# Patient Record
Sex: FEMALE | Race: WHITE | NOT HISPANIC OR LATINO | Employment: FULL TIME | URBAN - METROPOLITAN AREA
[De-identification: names, ages, dates, MRNs, and addresses within clinical notes are randomized per-mention and may not be internally consistent; named-entity substitution may affect disease eponyms.]

---

## 2017-06-13 ENCOUNTER — HISTORICAL (OUTPATIENT)
Dept: ADMINISTRATIVE | Facility: HOSPITAL | Age: 42
End: 2017-06-13

## 2017-06-13 LAB
APPEARANCE, UA: CLEAR
BACTERIA #/AREA URNS AUTO: NORMAL /HPF
BILIRUB UR QL STRIP: NEGATIVE
COLOR UR: YELLOW
GLUCOSE (UA): NEGATIVE
HGB UR QL STRIP: NEGATIVE
KETONES UR QL STRIP: NEGATIVE
LEUKOCYTE ESTERASE UR QL STRIP: NEGATIVE
NITRITE UR QL STRIP.AUTO: NEGATIVE
PH UR STRIP: 6.5 [PH] (ref 5–9)
PROT UR QL STRIP: NEGATIVE
RBC #/AREA URNS HPF: NORMAL /[HPF]
SP GR UR STRIP: 1 (ref 1–1.03)
SQUAMOUS EPITHELIAL, UA: NORMAL
UROBILINOGEN UR STRIP-ACNC: 0.2
WBC #/AREA URNS AUTO: NORMAL /[HPF]

## 2017-08-03 ENCOUNTER — HISTORICAL (OUTPATIENT)
Dept: ADMINISTRATIVE | Facility: HOSPITAL | Age: 42
End: 2017-08-03

## 2018-05-15 ENCOUNTER — HISTORICAL (OUTPATIENT)
Dept: ADMINISTRATIVE | Facility: HOSPITAL | Age: 43
End: 2018-05-15

## 2018-05-15 LAB
ABS NEUT (OLG): 4.6
ALBUMIN SERPL-MCNC: 4.4 GM/DL (ref 3.4–5)
ALBUMIN/GLOB SERPL: 1.83 {RATIO} (ref 1.5–2.5)
ALP SERPL-CCNC: 42 UNIT/L (ref 38–126)
ALT SERPL-CCNC: 12 UNIT/L (ref 7–52)
APPEARANCE, UA: CLEAR
AST SERPL-CCNC: 14 UNIT/L (ref 15–37)
BACTERIA #/AREA URNS AUTO: ABNORMAL /HPF
BILIRUB SERPL-MCNC: 0.7 MG/DL (ref 0.2–1)
BILIRUB UR QL STRIP: NEGATIVE MG/DL
BILIRUBIN DIRECT+TOT PNL SERPL-MCNC: 0.2 MG/DL (ref 0–0.5)
BILIRUBIN DIRECT+TOT PNL SERPL-MCNC: 0.5 MG/DL
BUN SERPL-MCNC: 16 MG/DL (ref 7–18)
CALCIUM SERPL-MCNC: 9 MG/DL (ref 8.5–10)
CHLORIDE SERPL-SCNC: 109 MMOL/L (ref 98–107)
CHOLEST SERPL-MCNC: 118 MG/DL (ref 0–200)
CHOLEST/HDLC SERPL: 2.6 {RATIO}
CO2 SERPL-SCNC: 28 MMOL/L (ref 21–32)
COLOR UR: ABNORMAL
CREAT SERPL-MCNC: 0.62 MG/DL (ref 0.6–1.3)
ERYTHROCYTE [DISTWIDTH] IN BLOOD BY AUTOMATED COUNT: 12.4 % (ref 11.5–17)
GLOBULIN SER-MCNC: 2.4 GM/DL (ref 1.2–3)
GLUCOSE (UA): NEGATIVE MG/DL
GLUCOSE SERPL-MCNC: 102 MG/DL (ref 74–106)
HCT VFR BLD AUTO: 40.3 % (ref 37–47)
HDLC SERPL-MCNC: 46 MG/DL (ref 35–60)
HGB BLD-MCNC: 13.8 GM/DL (ref 12–16)
HGB UR QL STRIP: ABNORMAL UNIT/L
KETONES UR QL STRIP: NEGATIVE MG/DL
LDLC SERPL CALC-MCNC: 51 MG/DL (ref 0–129)
LEUKOCYTE ESTERASE UR QL STRIP: NEGATIVE UNIT/L
LYMPHOCYTES # BLD AUTO: 2 X10(3)/MCL (ref 0.6–3.4)
LYMPHOCYTES NFR BLD AUTO: 27.4 % (ref 13–40)
MCH RBC QN AUTO: 31.9 PG (ref 27–31.2)
MCHC RBC AUTO-ENTMCNC: 34 GM/DL (ref 32–36)
MCV RBC AUTO: 93 FL (ref 80–94)
MONOCYTES # BLD AUTO: 0.7 X10(3)/MCL (ref 0–1.8)
MONOCYTES NFR BLD AUTO: 9.3 % (ref 0.1–24)
NEUTROPHILS NFR BLD AUTO: 63.3 % (ref 47–80)
NITRITE UR QL STRIP.AUTO: NEGATIVE
PH UR STRIP: 5 [PH]
PLATELET # BLD AUTO: 247 X10(3)/MCL (ref 130–400)
PMV BLD AUTO: 7.7 FL
POTASSIUM SERPL-SCNC: 5.4 MMOL/L (ref 3.5–5.1)
PROT SERPL-MCNC: 6.8 GM/DL (ref 6.4–8.2)
PROT UR QL STRIP: NEGATIVE MG/DL
RBC # BLD AUTO: 4.32 X10(6)/MCL (ref 4.2–5.4)
RBC #/AREA URNS HPF: ABNORMAL /HPF
SODIUM SERPL-SCNC: 139 MMOL/L (ref 136–145)
SP GR UR STRIP: <1.005
SQUAMOUS EPITHELIAL, UA: ABNORMAL /LPF
TRIGL SERPL-MCNC: 69 MG/DL (ref 30–150)
TSH SERPL-ACNC: 1.5 MIU/ML (ref 0.35–4.94)
UROBILINOGEN UR STRIP-ACNC: 0.2 MG/DL
VLDLC SERPL CALC-MCNC: 13.8 MG/DL
WBC # SPEC AUTO: 7.3 X10(3)/MCL (ref 4.5–11.5)
WBC #/AREA URNS AUTO: ABNORMAL /[HPF]

## 2019-05-16 ENCOUNTER — HISTORICAL (OUTPATIENT)
Dept: ADMINISTRATIVE | Facility: HOSPITAL | Age: 44
End: 2019-05-16

## 2019-05-16 LAB
ABS NEUT (OLG): 4.6 X10(3)/MCL (ref 2.1–9.2)
ALBUMIN SERPL-MCNC: 4.1 GM/DL (ref 3.4–5)
ALBUMIN/GLOB SERPL: 1.71 {RATIO} (ref 1.5–2.5)
ALP SERPL-CCNC: 42 UNIT/L (ref 38–126)
ALT SERPL-CCNC: 11 UNIT/L (ref 7–52)
APPEARANCE, UA: NORMAL
AST SERPL-CCNC: 13 UNIT/L (ref 15–37)
BACTERIA #/AREA URNS AUTO: NORMAL /HPF
BILIRUB SERPL-MCNC: 0.5 MG/DL (ref 0.2–1)
BILIRUB UR QL STRIP: NEGATIVE MG/DL
BILIRUBIN DIRECT+TOT PNL SERPL-MCNC: 0.2 MG/DL (ref 0–0.5)
BILIRUBIN DIRECT+TOT PNL SERPL-MCNC: 0.3 MG/DL
BUN SERPL-MCNC: 14 MG/DL (ref 7–18)
CALCIUM SERPL-MCNC: 9 MG/DL (ref 8.5–10)
CHLORIDE SERPL-SCNC: 106 MMOL/L (ref 98–107)
CHOLEST SERPL-MCNC: 119 MG/DL (ref 0–200)
CHOLEST/HDLC SERPL: 2.3 {RATIO}
CO2 SERPL-SCNC: 28 MMOL/L (ref 21–32)
COLOR UR: YELLOW
CREAT SERPL-MCNC: 0.6 MG/DL (ref 0.6–1.3)
ERYTHROCYTE [DISTWIDTH] IN BLOOD BY AUTOMATED COUNT: 12.1 % (ref 11.5–17)
GLOBULIN SER-MCNC: 2.4 GM/DL (ref 1.2–3)
GLUCOSE (UA): NEGATIVE MG/DL
GLUCOSE SERPL-MCNC: 100 MG/DL (ref 74–106)
HCT VFR BLD AUTO: 38.7 % (ref 37–47)
HDLC SERPL-MCNC: 51 MG/DL (ref 35–60)
HGB BLD-MCNC: 13.2 GM/DL (ref 12–16)
HGB UR QL STRIP: NEGATIVE UNIT/L
KETONES UR QL STRIP: NEGATIVE MG/DL
LDLC SERPL CALC-MCNC: 53 MG/DL (ref 0–129)
LEUKOCYTE ESTERASE UR QL STRIP: NEGATIVE UNIT/L
LYMPHOCYTES # BLD AUTO: 1.9 X10(3)/MCL (ref 0.6–3.4)
LYMPHOCYTES NFR BLD AUTO: 26.6 % (ref 13–40)
MCH RBC QN AUTO: 31.1 PG (ref 27–31.2)
MCHC RBC AUTO-ENTMCNC: 34 GM/DL (ref 32–36)
MCV RBC AUTO: 91 FL (ref 80–94)
MONOCYTES # BLD AUTO: 0.7 X10(3)/MCL (ref 0.1–1.3)
MONOCYTES NFR BLD AUTO: 10.3 % (ref 0.1–24)
NEUTROPHILS NFR BLD AUTO: 63.1 % (ref 47–80)
NITRITE UR QL STRIP.AUTO: NEGATIVE
PH UR STRIP: 6 [PH]
PLATELET # BLD AUTO: 243 X10(3)/MCL (ref 130–400)
PMV BLD AUTO: 7.9 FL (ref 9.4–12.4)
POTASSIUM SERPL-SCNC: 4.5 MMOL/L (ref 3.5–5.1)
PROT SERPL-MCNC: 6.5 GM/DL (ref 6.4–8.2)
PROT UR QL STRIP: NEGATIVE MG/DL
RBC # BLD AUTO: 4.24 X10(6)/MCL (ref 4.2–5.4)
RBC #/AREA URNS HPF: NORMAL /HPF
SODIUM SERPL-SCNC: 137 MMOL/L (ref 136–145)
SP GR UR STRIP: 1.02
SQUAMOUS EPITHELIAL, UA: NORMAL /LPF
TRIGL SERPL-MCNC: 63 MG/DL (ref 30–150)
UROBILINOGEN UR STRIP-ACNC: 0.2 MG/DL
VLDLC SERPL CALC-MCNC: 12.6 MG/DL
WBC # SPEC AUTO: 7.2 X10(3)/MCL (ref 4.5–11.5)
WBC #/AREA URNS AUTO: NORMAL /[HPF]

## 2020-05-18 ENCOUNTER — HISTORICAL (OUTPATIENT)
Dept: ADMINISTRATIVE | Facility: HOSPITAL | Age: 45
End: 2020-05-18

## 2020-05-18 LAB
ABS NEUT (OLG): 4.6 X10(3)/MCL (ref 2.1–9.2)
ALBUMIN SERPL-MCNC: 4.2 GM/DL (ref 3.4–5)
ALBUMIN/GLOB SERPL: 1.83 {RATIO} (ref 1.5–2.5)
ALP SERPL-CCNC: 50 UNIT/L (ref 38–126)
ALT SERPL-CCNC: 14 UNIT/L (ref 7–52)
APPEARANCE, UA: CLEAR
AST SERPL-CCNC: 16 UNIT/L (ref 15–37)
BACTERIA #/AREA URNS AUTO: ABNORMAL /HPF
BILIRUB SERPL-MCNC: 0.6 MG/DL (ref 0.2–1)
BILIRUB UR QL STRIP: NEGATIVE MG/DL
BILIRUBIN DIRECT+TOT PNL SERPL-MCNC: 0.1 MG/DL (ref 0–0.5)
BILIRUBIN DIRECT+TOT PNL SERPL-MCNC: 0.5 MG/DL
BUN SERPL-MCNC: 18 MG/DL (ref 7–18)
CALCIUM SERPL-MCNC: 9.4 MG/DL (ref 8.5–10)
CHLORIDE SERPL-SCNC: 106 MMOL/L (ref 98–107)
CHOLEST SERPL-MCNC: 145 MG/DL (ref 0–200)
CHOLEST/HDLC SERPL: 2.7 {RATIO}
CO2 SERPL-SCNC: 29 MMOL/L (ref 21–32)
COLOR UR: YELLOW
CREAT SERPL-MCNC: 0.61 MG/DL (ref 0.6–1.3)
ERYTHROCYTE [DISTWIDTH] IN BLOOD BY AUTOMATED COUNT: 12.2 % (ref 11.5–17)
GLOBULIN SER-MCNC: 2.3 GM/DL (ref 1.2–3)
GLUCOSE (UA): NEGATIVE MG/DL
GLUCOSE SERPL-MCNC: 97 MG/DL (ref 74–106)
HCT VFR BLD AUTO: 40 % (ref 37–47)
HDLC SERPL-MCNC: 54 MG/DL (ref 35–60)
HGB BLD-MCNC: 13.3 GM/DL (ref 12–16)
HGB UR QL STRIP: NEGATIVE UNIT/L
KETONES UR QL STRIP: NEGATIVE MG/DL
LDLC SERPL CALC-MCNC: 76 MG/DL (ref 0–129)
LEUKOCYTE ESTERASE UR QL STRIP: NEGATIVE UNIT/L
LYMPHOCYTES # BLD AUTO: 1.9 X10(3)/MCL (ref 0.6–3.4)
LYMPHOCYTES NFR BLD AUTO: 25.9 % (ref 13–40)
MCH RBC QN AUTO: 30.2 PG (ref 27–31.2)
MCHC RBC AUTO-ENTMCNC: 33 GM/DL (ref 32–36)
MCV RBC AUTO: 91 FL (ref 80–94)
MONOCYTES # BLD AUTO: 0.8 X10(3)/MCL (ref 0.1–1.3)
MONOCYTES NFR BLD AUTO: 11.3 % (ref 0.1–24)
NEUTROPHILS NFR BLD AUTO: 62.8 % (ref 47–80)
NITRITE UR QL STRIP.AUTO: NEGATIVE
PH UR STRIP: 5.5 [PH]
PLATELET # BLD AUTO: 286 X10(3)/MCL (ref 130–400)
PMV BLD AUTO: 7.9 FL (ref 9.4–12.4)
POTASSIUM SERPL-SCNC: 5 MMOL/L (ref 3.5–5.1)
PROT SERPL-MCNC: 6.5 GM/DL (ref 6.4–8.2)
PROT UR QL STRIP: NEGATIVE MG/DL
RBC # BLD AUTO: 4.4 X10(6)/MCL (ref 4.2–5.4)
RBC #/AREA URNS HPF: ABNORMAL /HPF
SODIUM SERPL-SCNC: 141 MMOL/L (ref 136–145)
SP GR UR STRIP: >1.03
SQUAMOUS EPITHELIAL, UA: ABNORMAL /LPF
TRIGL SERPL-MCNC: 52 MG/DL (ref 30–150)
UROBILINOGEN UR STRIP-ACNC: 0.2 MG/DL
VLDLC SERPL CALC-MCNC: 10.4 MG/DL
WBC # SPEC AUTO: 7.3 X10(3)/MCL (ref 4.5–11.5)
WBC #/AREA URNS AUTO: ABNORMAL /[HPF]

## 2021-05-26 ENCOUNTER — HISTORICAL (OUTPATIENT)
Dept: ADMINISTRATIVE | Facility: HOSPITAL | Age: 46
End: 2021-05-26

## 2021-05-26 LAB
ABS NEUT (OLG): 4.2 X10(3)/MCL (ref 2.1–9.2)
ALBUMIN SERPL-MCNC: 4 GM/DL (ref 3.4–5)
ALBUMIN/GLOB SERPL: 1.67 {RATIO} (ref 1.5–2.5)
ALP SERPL-CCNC: 46 UNIT/L (ref 38–126)
ALT SERPL-CCNC: 11 UNIT/L (ref 7–52)
APPEARANCE, UA: CLEAR
AST SERPL-CCNC: 15 UNIT/L (ref 15–37)
BACTERIA #/AREA URNS AUTO: ABNORMAL /HPF
BILIRUB SERPL-MCNC: 0.3 MG/DL (ref 0.2–1)
BILIRUB UR QL STRIP: NEGATIVE MG/DL
BILIRUBIN DIRECT+TOT PNL SERPL-MCNC: 0.1 MG/DL (ref 0–0.5)
BILIRUBIN DIRECT+TOT PNL SERPL-MCNC: 0.2 MG/DL
BUN SERPL-MCNC: 17 MG/DL (ref 7–18)
CALCIUM SERPL-MCNC: 9.1 MG/DL (ref 8.5–10)
CHLORIDE SERPL-SCNC: 106 MMOL/L (ref 98–107)
CHOLEST SERPL-MCNC: 128 MG/DL (ref 0–200)
CHOLEST/HDLC SERPL: 2.6 {RATIO}
CO2 SERPL-SCNC: 30 MMOL/L (ref 21–32)
COLOR UR: YELLOW
CREAT SERPL-MCNC: 0.58 MG/DL (ref 0.6–1.3)
ERYTHROCYTE [DISTWIDTH] IN BLOOD BY AUTOMATED COUNT: 12.5 % (ref 11.5–17)
GLOBULIN SER-MCNC: 2.4 GM/DL (ref 1.2–3)
GLUCOSE (UA): NEGATIVE MG/DL
GLUCOSE SERPL-MCNC: 98 MG/DL (ref 74–106)
HCT VFR BLD AUTO: 38.3 % (ref 37–47)
HDLC SERPL-MCNC: 50 MG/DL (ref 35–60)
HGB BLD-MCNC: 12.8 GM/DL (ref 12–16)
HGB UR QL STRIP: NEGATIVE UNIT/L
KETONES UR QL STRIP: NEGATIVE MG/DL
LDLC SERPL CALC-MCNC: 59 MG/DL (ref 0–129)
LEUKOCYTE ESTERASE UR QL STRIP: ABNORMAL UNIT/L
LYMPHOCYTES # BLD AUTO: 2 X10(3)/MCL (ref 0.6–3.4)
LYMPHOCYTES NFR BLD AUTO: 30.2 % (ref 13–40)
MCH RBC QN AUTO: 31.4 PG (ref 27–31.2)
MCHC RBC AUTO-ENTMCNC: 33 GM/DL (ref 32–36)
MCV RBC AUTO: 94 FL (ref 80–94)
MONOCYTES # BLD AUTO: 0.3 X10(3)/MCL (ref 0.1–1.3)
MONOCYTES NFR BLD AUTO: 5 % (ref 0.1–24)
NEUTROPHILS NFR BLD AUTO: 64.8 % (ref 47–80)
NITRITE UR QL STRIP.AUTO: NEGATIVE
PH UR STRIP: 5.5 [PH]
PLATELET # BLD AUTO: 290 X10(3)/MCL (ref 130–400)
PMV BLD AUTO: 7.7 FL (ref 9.4–12.4)
POTASSIUM SERPL-SCNC: 5.1 MMOL/L (ref 3.5–5.1)
PROT SERPL-MCNC: 6.4 GM/DL (ref 6.4–8.2)
PROT UR QL STRIP: NEGATIVE MG/DL
RBC # BLD AUTO: 4.08 X10(6)/MCL (ref 4.2–5.4)
RBC #/AREA URNS HPF: ABNORMAL /HPF
SODIUM SERPL-SCNC: 139 MMOL/L (ref 136–145)
SP GR UR STRIP: 1.02
SQUAMOUS EPITHELIAL, UA: ABNORMAL /LPF
TRIGL SERPL-MCNC: 104 MG/DL (ref 30–150)
UROBILINOGEN UR STRIP-ACNC: 0.2 MG/DL
VLDLC SERPL CALC-MCNC: 20.8 MG/DL
WBC # SPEC AUTO: 6.5 X10(3)/MCL (ref 4.5–11.5)
WBC #/AREA URNS AUTO: ABNORMAL /[HPF]

## 2022-04-10 ENCOUNTER — HISTORICAL (OUTPATIENT)
Dept: ADMINISTRATIVE | Facility: HOSPITAL | Age: 47
End: 2022-04-10

## 2022-04-24 VITALS
OXYGEN SATURATION: 98 % | BODY MASS INDEX: 24.22 KG/M2 | SYSTOLIC BLOOD PRESSURE: 112 MMHG | DIASTOLIC BLOOD PRESSURE: 70 MMHG | WEIGHT: 159.81 LBS | HEIGHT: 68 IN
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
## 2022-05-03 NOTE — HISTORICAL [facility] C[facility]ER
This is a historical note converted from Kemal. Formatting and pictures may have been removed.  Please reference Kemal for original formatting and attached multimedia. Chief Complaint  ANNUAL WELLNESS-NPO  History of Present Illness  Patient presents for wellness exam.  She has been feeling?well.  She had a COVID infection in November.  She is sleeping well.  Her appetite is very good.  She is an  for an all [employer].  She lives with her?spouse and 2 children:?20 and 18.  She has alcohol 1-2 times a month.  She does not smoke.  She has 1 cup of coffee a day.  She exercises?2-3 times a week.  GYN: Gilson DAVIS; Last mammogram: screening/diagnostic/ultrasound 8/2020.? He has not seen him in 2-3 years.  ?  Review of Systems  Constitutional:??no?weight gain,??no?weight loss,??no?fatigue, ?no?fever, ?no?chills, ?no?weakness, ?no?trouble sleeping  Eyes: ?no?vision loss/changes,??+?glasses?+ contacts,??no?pain,??no?redness,??no?blurry or double vision,??no?flashing lights,??no?glaucoma,??no?cataracts  Last eye exam:?less than 1 month ago  Neck: ?no?lymphadenopathy,??no?thyroid abnormalities,??no?bruits,??no?stiffness  Ears:??no?decreased hearing,??no?tinnitus,??no?earache,??no?drainage?  Nose:??no?congestion,??no?rhinorrhea,??no?epistaxis,??no?sinus pressure  Throat/Oral:??no?sore throat,??no?hoarseness, ?no?dental caries,??no?gum bleeding,??no?oral lesions  Cardiovascular:??no?chest pain, palpitations, or tightness,??no?dyspnea with exertion,??no?orthopnea,??no?paroxysmal nocturnal dyspnea  Respiratory:??no?cough,??no?sputum,??no?hemoptysis,??no?dyspnea,??no?wheezing,??no?pleuritic chest pain?  Gastrointestinal:??no?abdominal pain,??no?nausea,??no?vomiting,??no?heartburn,??no?dysphagia or odynophagia,??no?diarrhea,??no?constipation,??no?melena,??no?hematochezia,?no?jaundice, no family history of colon cancer  Urinary:??no?frequency,??+?urgency,??no?burning or pain,?no?hematuria,??+?incontinence: stress,  wears a liner, ??no?hesitancy,??no?incomplete voiding,??no?flank pain,??no?nocturia  Musculoskeletal:?no?myalgias,??no?arthralgias,?+?neck pain: DDD?C-spine, continuous, losing?range of motion, she continues to have radicular symptoms left upper extremity, ?no?back pain,??no?swelling of extremities  Skin:?no?rashes,??no?sores,??no?non-healing wounds  Neurologic:??no?headaches,??no?dizziness/lightheadedness,??no?tremors,??no?paresthesias,??no?seizures,??no?muscle weakness  Psychiatric:??no?depression/sadness,??no?anhedonia,??no?irritability,??no?suicidal ideations,??+?anxiety,??no?panic attacks  Endocrine:??no?hot or cold intolerance,??no?sweating,??no?polyuria,??no?polydipsia,??no?polyphagia  Hematologic:??no?bruising,??no?bleeding disorders?  Physical Exam  Vitals & Measurements  T:?36.7? ?C (Oral)? HR:?55(Peripheral)? BP:?112/70? SpO2:?98%?  HT:?172.72?cm? HT:?172.72?cm? WT:?72.500?kg? WT:?72.5?kg? BMI:?24.3?  PHYSICAL EXAMINATION:  GENERAL: The patient is a well-developed, well-nourished white?female in no?apparent distress. She is alert and oriented x 4.  HEENT: Head is normocephalic and atraumatic. Extraocular muscles are intact. Pupils are equal, round, and reactive to light and accommodation. Nares appeared normal. Mouth is well hydrated and without lesions. Mucous membranes are moist. Posterior pharynx clear of any exudate or lesions.  NECK: Supple. No carotid bruits.? No lymphadenopathy or thyromegaly. She has diffuse midline tenderness.?She has decreased range of motion in all planes, especially with extension?and rotation to right.?Positive Spurlings to right.  LUNGS: Clear to auscultation.  HEART: Regular rate and rhythm without murmur, gallops or rubs.  ABDOMEN: Soft, nontender, and nondistended.? Positive bowel sounds.? No hepatosplenomegaly was noted.  EXTREMITIES: Without any cyanosis, clubbing, rash, lesions or edema. Right upper extremity:?Strength 5-/5+ throughout, no gross motor deficits  appreciated.?She does have decreased strength?relative to right.  NEUROLOGIC: Cranial nerves II through XII are grossly intact.? No motor or sensory deficits.? Cerebellar function intact.  SKIN: No ulceration or induration present.  ?  ?  Assessment/Plan  1.?Wellness examination?Z00.00  ?Patient presents for wellness examination.  Patient has been feeling well?except for continued?neck pain with left upper extremity radiculopathy.  Her anxiety is stable.  She will be due for screening mammogram in?August; will send order.  Pt had a COVID infection in November.  Labs pending.  Ordered:  CBC w/ Auto Diff, Routine collect, 05/26/21 7:58:00 CDT, Blood, Order for future visit, Stop date 05/26/21 7:58:00 CDT, Lab Collect, Wellness examination, 05/26/21 7:58:00 CDT  Clinic Follow-Up Wellness, *Est. 05/26/22 3:00:00 CDT, Order for future visit, Wellness examination, [facility]  Comprehensive Metabolic Panel, Routine collect, 05/26/21 7:58:00 CDT, Blood, Order for future visit, Stop date 05/26/21 7:58:00 CDT, Lab Collect, Wellness examination, 05/26/21 7:58:00 CDT  Lab Collection Request, 05/26/21 7:58:00 CDT, [facility] - [facility], 05/26/21 7:58:00 CDT, Wellness examination  Lipid Panel, Routine collect, 05/26/21 7:58:00 CDT, Blood, Order for future visit, Stop date 05/26/21 7:58:00 CDT, Lab Collect, Wellness examination, 05/26/21 7:58:00 CDT  Preventative Health Care Est 40-64 years [id number] PC, Wellness examination  Anxiety  DDD (degenerative disc disease), cervical, [facility] - [facility], 05/26/21 7:59:00 CDT  Urinalysis no Reflex, Routine collect, Urine, Order for future visit, 05/26/21 7:58:00 CDT, Stop date 05/26/21 7:58:00 CDT, Nurse collect, Wellness examination  ?  2.?DDD (degenerative disc disease), cervical?M50.30  ?Patient continues to have ongoing neck pain.  She continues have left upper?extremity radicular?symptoms.  We will update her x-rays.  We will set up for an?up-to-date MRI.  Will refer to Dr. Johnson for  evaluation.  She had seen Dr. Rodriguez; he wanted to do surgery.  Continue meloxicam; refills sent.  Patient advised?to take Prilosec or Pepcid over-the-counter for GI protection. Patient advised?long-term NSAID usage can you to?GI bleeding,?inflammation, ulceration etc.  Ordered:  External Referral, Neck pain, LUE radiculopathy, Spine, Dr Johnson, 05/26/21 8:08:00 CDT, DDD (degenerative disc disease), cervical  Office/Outpatient Visit Level 3 Established [id number] PC, DDD (degenerative disc disease), cervical, [facility] - [facility], 05/26/21 7:59:00 CDT  Preventative Health Care Est 40-64 years 45063 PC, Wellness examination  Anxiety  DDD (degenerative disc disease), cervical, [facility] - [facility], 05/26/21 7:59:00 CDT  Schedule Diagnostics Study, MRI C-spine without, next available [facility], 05/26/21 8:06:00 CDT, DDD (degenerative disc disease), cervical  XR Spine Cervical 2 or 3 Views, Routine, 05/26/21 7:58:00 CDT, Other (please specify), None, Stretcher, Patient Has IV?, Rad Type, DDD (degenerative disc disease), cervical, Not Scheduled, [facility], 05/26/21 7:58:00 CDT  ?  3.?Anxiety?F41.9  ?Stable; continue current medication.  Ordered:  Preventative Health Care Est 40-64 years [id number] PC, Wellness examination  Anxiety  DDD (degenerative disc disease), cervical, [facility] - [facility], 05/26/21 7:59:00 CDT  ?  Orders:  escitalopram, 10 mg = 1 tab(s), Oral, Daily, # 90 tab(s), 3 Refill(s), Pharmacy: [facility], 172.72, cm, Height/Length Dosing, 05/26/21 7:34:00 CDT, 72.5, kg, Weight Dosing, 05/26/21 7:34:00 CDT  meloxicam, 15 mg = 1 tab(s), Oral, Daily, # 90 tab(s), 3 Refill(s), Pharmacy: [facility], 172.72, cm, Height/Length Dosing, 05/26/21 7:34:00 CDT, 72.5, kg, Weight Dosing, 05/26/21 7:34:00 CDT  Schedule Diagnostics Study, screening mammogram, [facility], 05/26/21 8:06:00 CDT, Screening mammogram, encounter for  Referrals  External Referral, Neck pain, LUE radiculopathy,  Spine, Dr Johnson, 05/26/21 8:08:00 CDT, DDD (degenerative disc disease), cervical  Clinic Follow-Up Wellness, *Est. 05/26/22 3:00:00 CDT, Order for future visit, Wellness examination, [facility]   Problem List/Past Medical History  Ongoing  Anxiety  CMT - Charcot-Bernie-Tooth disease  DDD (degenerative disc disease), cervical  Historical  bulging disc in neck  CMT (Charcot-Bernie-Tooth disease)  Right foot drop  Procedure/Surgical History  Endometrial ablation (04/11/2014)  Endometrial ablation, thermal, without hysteroscopic guidance. (04/11/2014)  Excision or destruction of lesion of cul-de-sac (04/11/2014)  Laparoscopy, surgical; with fulguration of oviducts (with or without transection). (04/11/2014)  Laparoscopy, surgical; with fulguration or excision of lesions of the ovary, pelvic viscera, or peritoneal surface by any method. (04/11/2014)  Other bilateral endoscopic destruction or occlusion of fallopian tubes (04/11/2014)  Radiofrequency Ablation Novasure (.) (04/11/2014)  Tubal Ligation Laparoscopic (.) (04/11/2014)  Cholecystectomy (08/30/2006)  Bilateral ankle surgery  left arm surgery  Rt Ankle Surgery   Medications  escitalopram 10 mg oral tablet, 10 mg= 1 tab(s), Oral, Daily, 3 refills  meloxicam 15 mg oral tablet, 15 mg= 1 tab(s), Oral, Daily, 3 refills  Allergies  Allergy to penicillin  Tramadol  codeine  Social History  Abuse/Neglect  No, 05/26/2021  No, No, Yes, 11/11/2020  No, 05/18/2020  Alcohol  Current, Wine, Liquor, 1-2 times per month, 05/26/2021  Current, 1-2 times per month, 05/15/2018  Employment/School  Employed, Work/School description: ., 05/26/2021  Employed, Work/School description:  FOR [employer] 05/15/2018  Exercise  Exercise duration: 75. Exercise frequency: 3-4 times/week. Exercise type: Walking., 05/26/2021  Exercise frequency: 3-4 times/week., 05/15/2018  Home/Environment  Lives with Children, Spouse. Living situation: Home/Independent.,  05/26/2021  Lives with Children, Spouse. Living situation: Home/Independent., 05/15/2018  Nutrition/Health  Regular, Good, 05/26/2021  Regular, Caffeine intake amount: 1 CAFFEINATED BEVERAGE PER DAY., 05/15/2018  Substance Use  Never, 05/26/2021  Never, 05/15/2018  Tobacco - Denies Tobacco Use, 04/11/2014  Never (less than 100 in lifetime), N/A, 05/26/2021  Never (less than 100 in lifetime), N/A, 11/11/2020  Family History  Diabetes mellitus: Mother.  Hypertension: Father.  Immunizations  Vaccine Date Status   influenza virus vaccine, inactivated 11/04/2019 Recorded   tetanus/diphtheria/pertussis, acel(Tdap) 06/13/2013 Recorded   Health Maintenance  Health Maintenance  ???Pending?(in the next year)  ??? ??OverDue  ??? ? ? ?Influenza Vaccine due??10/01/20??and every 1??day(s)  ??? ? ? ?Alcohol Misuse Screening due??01/02/21??and every 1??year(s)  ??? ??Due?  ??? ? ? ?ADL Screening due??05/26/21??and every 1??year(s)  ??? ? ? ?Depression Screening due??05/26/21??Unknown Frequency  ??? ??Due In Future?  ??? ? ? ?Cervical Cancer Screening not due until??08/01/21??and every 3??year(s)  ??? ? ? ?Obesity Screening not due until??01/01/22??and every 1??year(s)  ???Satisfied?(in the past 1 year)  ??? ??Satisfied?  ??? ? ? ?Blood Pressure Screening on??05/26/21.??Satisfied by Shreyas Childs LPN  ??? ? ? ?Body Mass Index Check on??05/26/21.??Satisfied by Shreyas Childs LPN  ??? ? ? ?Breast Cancer Screening on??08/19/20.??Satisfied by Beatrice Walker  ??? ? ? ?Obesity Screening on??05/26/21.??Satisfied by Shreyas Childs LPN  ?      X-rays of cervical spine reveals straightening of the spine.? She has osteoarthritis of the facets at all levels.? She has advanced degenerative disc disease at C5 and C6.? Correlation with MRI is recommended to rule out spinal or foraminal stenosis.

## 2022-05-30 PROBLEM — Z00.00 ENCOUNTER FOR WELLNESS EXAMINATION IN ADULT: Status: ACTIVE | Noted: 2022-05-30

## 2022-06-23 PROBLEM — E03.9 HYPOTHYROIDISM: Status: ACTIVE | Noted: 2022-06-23

## 2022-08-29 PROBLEM — Z00.00 ENCOUNTER FOR WELLNESS EXAMINATION IN ADULT: Status: RESOLVED | Noted: 2022-05-30 | Resolved: 2022-08-29

## 2023-04-23 ENCOUNTER — OFFICE VISIT (OUTPATIENT)
Dept: URGENT CARE | Facility: CLINIC | Age: 48
End: 2023-04-23
Payer: COMMERCIAL

## 2023-04-23 VITALS
TEMPERATURE: 99 F | BODY MASS INDEX: 24.25 KG/M2 | WEIGHT: 160 LBS | HEART RATE: 60 BPM | SYSTOLIC BLOOD PRESSURE: 118 MMHG | HEIGHT: 68 IN | OXYGEN SATURATION: 100 % | RESPIRATION RATE: 18 BRPM | DIASTOLIC BLOOD PRESSURE: 77 MMHG

## 2023-04-23 DIAGNOSIS — L23.7 POISON IVY DERMATITIS: Primary | ICD-10-CM

## 2023-04-23 PROCEDURE — 96372 THER/PROPH/DIAG INJ SC/IM: CPT | Mod: ,,,

## 2023-04-23 PROCEDURE — 99212 OFFICE O/P EST SF 10 MIN: CPT | Mod: 25,,,

## 2023-04-23 PROCEDURE — 96372 PR INJECTION,THERAP/PROPH/DIAG2ST, IM OR SUBCUT: ICD-10-PCS | Mod: ,,,

## 2023-04-23 PROCEDURE — 99212 PR OFFICE/OUTPT VISIT, EST, LEVL II, 10-19 MIN: ICD-10-PCS | Mod: 25,,,

## 2023-04-23 RX ORDER — PREDNISONE 20 MG/1
TABLET ORAL
Qty: 9 TABLET | Refills: 0 | Status: SHIPPED | OUTPATIENT
Start: 2023-04-23 | End: 2023-04-29

## 2023-04-23 RX ORDER — TRIAMCINOLONE ACETONIDE 5 MG/G
OINTMENT TOPICAL 2 TIMES DAILY
Qty: 15 G | Refills: 1 | Status: SHIPPED | OUTPATIENT
Start: 2023-04-23 | End: 2023-04-23

## 2023-04-23 RX ORDER — PREDNISONE 20 MG/1
TABLET ORAL
Qty: 9 TABLET | Refills: 0 | Status: SHIPPED | OUTPATIENT
Start: 2023-04-23 | End: 2023-04-23

## 2023-04-23 RX ORDER — DEXAMETHASONE SODIUM PHOSPHATE 100 MG/10ML
10 INJECTION INTRAMUSCULAR; INTRAVENOUS
Status: COMPLETED | OUTPATIENT
Start: 2023-04-23 | End: 2023-04-23

## 2023-04-23 RX ORDER — HYDROXYZINE PAMOATE 25 MG/1
25 CAPSULE ORAL EVERY 6 HOURS PRN
Qty: 12 CAPSULE | Refills: 0 | Status: SHIPPED | OUTPATIENT
Start: 2023-04-23 | End: 2023-04-26

## 2023-04-23 RX ORDER — DEXAMETHASONE SODIUM PHOSPHATE 100 MG/10ML
10 INJECTION INTRAMUSCULAR; INTRAVENOUS
Status: DISCONTINUED | OUTPATIENT
Start: 2023-04-23 | End: 2023-04-23

## 2023-04-23 RX ORDER — TRIAMCINOLONE ACETONIDE 5 MG/G
OINTMENT TOPICAL 2 TIMES DAILY
Qty: 15 G | Refills: 1 | Status: SHIPPED | OUTPATIENT
Start: 2023-04-23 | End: 2023-06-26

## 2023-04-23 RX ORDER — HYDROXYZINE PAMOATE 25 MG/1
25 CAPSULE ORAL EVERY 6 HOURS PRN
Qty: 12 CAPSULE | Refills: 0 | Status: SHIPPED | OUTPATIENT
Start: 2023-04-23 | End: 2023-04-23

## 2023-04-23 RX ADMIN — DEXAMETHASONE SODIUM PHOSPHATE 10 MG: 100 INJECTION INTRAMUSCULAR; INTRAVENOUS at 09:04

## 2023-04-23 NOTE — PATIENT INSTRUCTIONS
Take Claritin or zyrtec daily for itching, it is non sedating   Take vistaril or benadryl at night for itching do not take both. May cause sedation/drowsiness (safety precautions discussed).  Use the steroid cream as directed; do not use on your face   Keep the area clean and dry   Start the oral steroids tomorrow   Follow-up with your Primary Care Provider as needed.   Go to the emergency department with any significant change or worsening of symptoms.  Please monitor for any signs of infection such as worsening redness, swelling, pain, purulent discharge, fever, body aches, or chills and seek follow-up care as needed.

## 2023-04-23 NOTE — PROGRESS NOTES
"Subjective:      Patient ID: Roberth Castillo is a 47 y.o. female.    Vitals:  height is 5' 8" (1.727 m) and weight is 72.6 kg (160 lb). Her temperature is 98.5 °F (36.9 °C). Her blood pressure is 118/77 and her pulse is 60. Her respiration is 18 and oxygen saturation is 100%.     Chief Complaint: Poison Ivy (Poison ivy arms and right ankle x6d rubbing alcohol, calamine lotion and OTC itch cream mild/Working in garden recently )     Patient is a 47 y.o.  female who presents to urgent care with complaints of Poison ivy arms and right ankle x6 days. Alleviating factors include rubbing alcohol, calamine lotion and OTC itch cream  with mild amount of relief.  Working in garden recently       Poison Ivy  ROS   Objective:     Physical Exam    Assessment:     No diagnosis found.    Plan:       There are no diagnoses linked to this encounter.                "

## 2023-04-23 NOTE — PROGRESS NOTES
"Subjective:      Patient ID: Roberth Castillo is a 47 y.o. female.    Vitals:  height is 5' 8" (1.727 m) and weight is 72.6 kg (160 lb). Her temperature is 98.5 °F (36.9 °C). Her blood pressure is 118/77 and her pulse is 60. Her respiration is 18 and oxygen saturation is 100%.     Chief Complaint: Poison Ivy (Poison ivy arms and right ankle x6d rubbing alcohol, calamine lotion and OTC itch cream mild/Working in garden recently )    A 46 y/o female presents to the clinic with c/o rash and itching to both forearms and legs x 6 days. It started after working in her garden. She has attempted  otc calamine lotion and OTC itch cream with little relief. She denies any pain to the area, drainage from the rash, fever, shortness of breath, lip or tongue, swelling, change in perfume or detergent or starting any new medications recently.     Poison Denice  Pertinent negatives include no fever or shortness of breath.     Constitution: Negative for fever.   HENT: Negative.     Neck: neck negative.   Cardiovascular: Negative.    Respiratory:  Negative for shortness of breath and wheezing.    Gastrointestinal: Negative.    Skin:  Positive for rash.    Objective:     Physical Exam   Constitutional: She is oriented to person, place, and time. She appears well-developed. She is cooperative.  Non-toxic appearance. She does not appear ill. No distress.   HENT:   Head: Normocephalic and atraumatic.   Ears:   Right Ear: Hearing and external ear normal.   Left Ear: Hearing and external ear normal.   Mouth/Throat: Oropharynx is clear and moist and mucous membranes are normal.   Eyes: Conjunctivae and lids are normal.   Neck: Trachea normal and phonation normal. Neck supple. No edema present. No erythema present. No neck rigidity present.   Cardiovascular: Normal rate.   Pulmonary/Chest: Effort normal and breath sounds normal. No stridor. No respiratory distress. She has no decreased breath sounds. She has no wheezes. She has no rhonchi. She " has no rales.   Abdominal: Normal appearance.   Neurological: She is alert and oriented to person, place, and time. She exhibits normal muscle tone.   Skin: Skin is warm, dry, intact, not diaphoretic and rash (generalized maculopapular rash to bilateral forearms and legs).   Psychiatric: Her speech is normal and behavior is normal. Mood normal.   Nursing note and vitals reviewed.    Assessment:     1. Poison ivy dermatitis        Plan:       Poison ivy dermatitis    Other orders  -     Discontinue: dexAMETHasone injection 10 mg  -     Discontinue: predniSONE (DELTASONE) 20 MG tablet; Take 2 tablets (40 mg total) by mouth once daily for 2 days, THEN 1.5 tablets (30 mg total) once daily for 2 days, THEN 1 tablet (20 mg total) once daily for 2 days.  Dispense: 9 tablet; Refill: 0  -     Discontinue: hydrOXYzine pamoate (VISTARIL) 25 MG Cap; Take 1 capsule (25 mg total) by mouth every 6 (six) hours as needed (itching).  Dispense: 12 capsule; Refill: 0  -     Discontinue: triamcinolone (KENALOG) 0.5 % ointment; Apply topically 2 (two) times daily. for 5 days  Dispense: 15 g; Refill: 1  -     hydrOXYzine pamoate (VISTARIL) 25 MG Cap; Take 1 capsule (25 mg total) by mouth every 6 (six) hours as needed (itching).  Dispense: 12 capsule; Refill: 0  -     predniSONE (DELTASONE) 20 MG tablet; Take 2 tablets (40 mg total) by mouth once daily for 2 days, THEN 1.5 tablets (30 mg total) once daily for 2 days, THEN 1 tablet (20 mg total) once daily for 2 days.  Dispense: 9 tablet; Refill: 0  -     triamcinolone (KENALOG) 0.5 % ointment; Apply topically 2 (two) times daily. for 5 days  Dispense: 15 g; Refill: 1  -     dexAMETHasone injection 10 mg    Take Claritin or zyrtec daily for itching, it is non sedating   Take vistaril or benadryl at night for itching do not take both. May cause sedation/drowsiness (safety precautions discussed).  Use the steroid cream as directed; do not use on your face   Keep the area clean and dry   Start  the oral steroids tomorrow   Follow-up with your Primary Care Provider as needed.   Go to the emergency department with any significant change or worsening of symptoms.  Please monitor for any signs of infection such as worsening redness, swelling, pain, purulent discharge, fever, body aches, or chills and seek follow-up care as needed.

## 2023-06-26 PROBLEM — M25.512 ACUTE PAIN OF LEFT SHOULDER: Status: ACTIVE | Noted: 2023-06-26

## 2023-06-26 PROBLEM — Z12.11 COLON CANCER SCREENING: Status: ACTIVE | Noted: 2023-06-26

## 2023-09-25 PROBLEM — Z00.00 ENCOUNTER FOR WELLNESS EXAMINATION IN ADULT: Status: RESOLVED | Noted: 2022-05-30 | Resolved: 2023-09-25

## 2024-03-06 ENCOUNTER — PATIENT OUTREACH (OUTPATIENT)
Dept: ADMINISTRATIVE | Facility: HOSPITAL | Age: 49
End: 2024-03-06
Payer: COMMERCIAL

## 2024-03-06 NOTE — LETTER
"  This communication is flagged as high priority.        AUTHORIZATION FOR RELEASE OF   CONFIDENTIAL INFORMATION    Dear Staff,    We are seeing Roberth Castillo, date of birth 1975, in the clinic at [facility]. Saul Rojo MD is the patient's PCP. Roberth Castillo has an outstanding lab/procedure at the time we reviewed her chart. In order to help keep her health information updated, she has authorized us to request the following medical record(s):        (  )  MAMMOGRAM                                      (xx  )  COLONOSCOPY/PATH      (  )  PAP SMEAR                                          (  )  OUTSIDE LAB RESULTS     (  )  DEXA SCAN                                          (  )  EYE EXAM            (  )  FOOT EXAM                                          (  )  ENTIRE RECORD     (  )  OUTSIDE IMMUNIZATIONS                 (  )  _______________         Please fax records to [facility], Manuel, Chad B., MD,  [phone]  Attn: Huma       If you have any questions, please contact Lisandro Cameron" [facility] Coordinator @ [phone]    Patient Name: Roberth Castillo  : 1975  Patient Phone #: [phone]     "

## 2024-03-18 ENCOUNTER — OFFICE VISIT (OUTPATIENT)
Dept: URGENT CARE | Facility: CLINIC | Age: 49
End: 2024-03-18
Payer: COMMERCIAL

## 2024-03-18 VITALS
RESPIRATION RATE: 18 BRPM | SYSTOLIC BLOOD PRESSURE: 125 MMHG | OXYGEN SATURATION: 99 % | TEMPERATURE: 98 F | DIASTOLIC BLOOD PRESSURE: 85 MMHG | WEIGHT: 149 LBS | HEART RATE: 74 BPM | HEIGHT: 68 IN | BODY MASS INDEX: 22.58 KG/M2

## 2024-03-18 DIAGNOSIS — J02.9 SORE THROAT: Primary | ICD-10-CM

## 2024-03-18 DIAGNOSIS — B00.1 FEVER BLISTER: ICD-10-CM

## 2024-03-18 LAB
CTP QC/QA: YES
MOLECULAR STREP A: NEGATIVE

## 2024-03-18 PROCEDURE — 96372 THER/PROPH/DIAG INJ SC/IM: CPT | Mod: ,,, | Performed by: FAMILY MEDICINE

## 2024-03-18 PROCEDURE — 87651 STREP A DNA AMP PROBE: CPT | Mod: QW,,, | Performed by: FAMILY MEDICINE

## 2024-03-18 PROCEDURE — 99213 OFFICE O/P EST LOW 20 MIN: CPT | Mod: 25,,, | Performed by: FAMILY MEDICINE

## 2024-03-18 RX ORDER — VALACYCLOVIR HYDROCHLORIDE 1 G/1
1000 TABLET, FILM COATED ORAL 3 TIMES DAILY
Qty: 21 TABLET | Refills: 0 | Status: SHIPPED | OUTPATIENT
Start: 2024-03-18 | End: 2024-03-25

## 2024-03-18 RX ORDER — PREDNISONE 10 MG/1
30 TABLET ORAL DAILY
Qty: 15 TABLET | Refills: 0 | Status: SHIPPED | OUTPATIENT
Start: 2024-03-18 | End: 2024-03-23

## 2024-03-18 RX ORDER — DEXAMETHASONE SODIUM PHOSPHATE 100 MG/10ML
10 INJECTION INTRAMUSCULAR; INTRAVENOUS
Status: COMPLETED | OUTPATIENT
Start: 2024-03-18 | End: 2024-03-18

## 2024-03-18 RX ORDER — LIDOCAINE HYDROCHLORIDE 20 MG/ML
SOLUTION OROPHARYNGEAL
Qty: 100 ML | Refills: 0 | Status: SHIPPED | OUTPATIENT
Start: 2024-03-18

## 2024-03-18 RX ADMIN — DEXAMETHASONE SODIUM PHOSPHATE 10 MG: 100 INJECTION INTRAMUSCULAR; INTRAVENOUS at 12:03

## 2024-03-18 NOTE — PROGRESS NOTES
"Subjective:      Patient ID: Roberth Castillo is a 48 y.o. female.    Vitals:  height is 5' 8" (1.727 m) and weight is 67.6 kg (149 lb). Her temperature is 97.9 °F (36.6 °C). Her blood pressure is 125/85 and her pulse is 74. Her respiration is 18 and oxygen saturation is 99%.     Chief Complaint: Sinus Problem ( Patient is a 48 y.o. female who presents to urgent care with complaints of post nasal drip , rash on face, lips, and bilateral,  sore throat exposed to covid self test negative today x 5 days . Patient denies fever. )    48-year-old female presents to clinic complaining of 5 day history of severe sore throat and postnasal drip.  Denies any runny nose stuffy nose sinus pressure sinus congestion cough fever.  Also states she is breaking out in a fever blister on the left side of the lips.  Also has a burning red rash behind the right ear.  States she did use hair product and took the hair behind the ear not sure if that has an allergic reaction she is having.  States she was exposed to COVID but took several at home COVID test were all negative.        Constitution: Negative.   HENT:  Positive for sore throat.    Cardiovascular: Negative.    Eyes: Negative.    Respiratory: Negative.     Gastrointestinal: Negative.    Genitourinary: Negative.    Musculoskeletal: Negative.    Skin:  Positive for rash and erythema (is a flat erythemic rash behind the right ear.  No lesions or vesicles present.  There are small vesicles present on the left lower lip near the corner.).   Allergic/Immunologic: Negative.    Neurological: Negative.    Hematologic/Lymphatic: Negative.       Objective:     Physical Exam   Constitutional: She is oriented to person, place, and time.  Non-toxic appearance. She does not appear ill. No distress.   HENT:   Head: Normocephalic and atraumatic.   Ears:   Right Ear: Tympanic membrane and external ear normal.   Left Ear: Tympanic membrane and external ear normal.   Mouth/Throat: Posterior " oropharyngeal erythema present. No oropharyngeal exudate.   Eyes: Conjunctivae are normal.   Pulmonary/Chest: Effort normal and breath sounds normal. No stridor. No respiratory distress. She has no wheezes. She has no rhonchi. She has no rales.   Abdominal: Normal appearance.   Neurological: She is alert and oriented to person, place, and time.   Skin: Skin is not diaphoretic. erythema (is a flat erythemic rash behind the right ear.  No lesions or vesicles present.  There are small vesicles present on the left lower lip near the corner.)   Psychiatric: Her behavior is normal. Mood, judgment and thought content normal.   Vitals reviewed.         Previous History      Review of patient's allergies indicates:   Allergen Reactions    Codeine     Penicillin     Tramadol        Past Medical History:   Diagnosis Date    Bulging of cervical intervertebral disc     CMT (Charcot-Bernie-Tooth disease)     Left foot drop     Right foot drop      Current Outpatient Medications   Medication Instructions    EScitalopram oxalate (LEXAPRO) 10 mg, Oral, Daily    LIDOcaine viscous HCl 2% (XYLOCAINE) 2 % Soln 5ml gargle and spit q4 hrs prn sore throat    meloxicam (MOBIC) 15 mg, Oral, Daily    NP THYROID 60 mg Tab TAKE 1 TABLET BY MOUTH ONCE DAILY    predniSONE (DELTASONE) 30 mg, Oral, Daily    UNABLE TO FIND medication name: diatomaceous earth - 2 tbsp of powder mixed with water every morning     valACYclovir (VALTREX) 1,000 mg, Oral, 3 times daily     Past Surgical History:   Procedure Laterality Date    Bilateral ankle surgery      CHOLECYSTECTOMY  08/30/2014    ENDOMETRIAL ABLATION  04/11/2014    left arm surgery      Rt Ankle Surgery      TUBAL LIGATION  04/11/2014     Family History   Problem Relation Age of Onset    Diabetes type II Mother     Hypertension Father        Social History     Tobacco Use    Smoking status: Never     Passive exposure: Never    Smokeless tobacco: Never   Substance Use Topics    Alcohol use: Yes      "Comment: 1-2 times a month    Drug use: Never        Physical Exam      Vital Signs Reviewed   /85   Pulse 74   Temp 97.9 °F (36.6 °C)   Resp 18   Ht 5' 8" (1.727 m)   Wt 67.6 kg (149 lb)   SpO2 99%   BMI 22.66 kg/m²        Procedures    Procedures     Labs     Results for orders placed or performed in visit on 03/18/24   POCT Strep A, Molecular   Result Value Ref Range    Molecular Strep A, POC Negative Negative     Acceptable Yes        Assessment:     1. Sore throat    2. Fever blister        Plan:   Strep negative  Medications sent to pharmacy  Start oral steroids tomorrow  Monitor for fever  Start Flonase and Claritin daily  If rash worsens or sore throat worsens return to clinic or seek medical attention immediately    Sore throat  -     POCT Strep A, Molecular    Fever blister    Other orders  -     dexAMETHasone injection 10 mg  -     predniSONE (DELTASONE) 10 MG tablet; Take 3 tablets (30 mg total) by mouth once daily. for 5 days  Dispense: 15 tablet; Refill: 0  -     LIDOcaine viscous HCl 2% (XYLOCAINE) 2 % Soln; 5ml gargle and spit q4 hrs prn sore throat  Dispense: 100 mL; Refill: 0  -     valACYclovir (VALTREX) 1000 MG tablet; Take 1 tablet (1,000 mg total) by mouth 3 (three) times daily. for 7 days  Dispense: 21 tablet; Refill: 0                    "

## 2024-03-18 NOTE — PATIENT INSTRUCTIONS
Plan:   Strep negative  Medications sent to pharmacy  Start oral steroids tomorrow  Monitor for fever  Start Flonase and Claritin daily  If rash worsens or sore throat worsens return to clinic or seek medical attention immediately

## 2024-03-25 ENCOUNTER — DOCUMENTATION ONLY (OUTPATIENT)
Dept: PRIMARY CARE CLINIC | Facility: CLINIC | Age: 49
End: 2024-03-25
Payer: COMMERCIAL

## 2024-06-20 DIAGNOSIS — Z00.00 WELLNESS EXAMINATION: Primary | ICD-10-CM

## 2024-06-23 NOTE — PROGRESS NOTES
..Annual Exam (Hair loss)       HISTORY OF PRESENT ILLNESS    This 48 y.o. female patient presents to the clinic today for a wellness CPX.  She has been feeling well.    Pt reports hair loss over the past year.  She sleeps well.    Her appetite has been good.    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  She is  with 2 children.    She has alcohol once to twice a month.    She does not smoke.    She has 1 cup coffee a day.    She exercises 4 or 5 times a week.  Gyn:  Dr. Riggins; last checkup; she has not been in years.  Last mammogram: Last mammogram of 5/05/2023; [facility].  She has never had a colonoscopy.  Spine: Dr Johnson; had MRI in February. She continues to have left thumb numbness and weakness.      The patient's Health Maintenance was reviewed and the following appears to be due at this time:   Health Maintenance Due   Topic Date Due    Hepatitis C Screening  Never done    HIV Screening  Never done    Colorectal Cancer Screening  Never done    Cervical Cancer Screening  08/02/2021    TETANUS VACCINE  06/13/2023    COVID-19 Vaccine (1 - 2023-24 season) Never done    Shingles Vaccine (1 of 2) Never done    Mammogram  05/05/2024       ..  Past Medical History:   Diagnosis Date    Bulging of cervical intervertebral disc     CMT (Charcot-Bernie-Tooth disease)     Right foot drop           ..  Past Surgical History:   Procedure Laterality Date    Bilateral ankle surgery      CHOLECYSTECTOMY  08/30/2014    ENDOMETRIAL ABLATION  04/11/2014    left arm surgery      Rt Ankle Surgery      TUBAL LIGATION  04/11/2014          Current Outpatient Medications   Medication Instructions    EScitalopram oxalate (LEXAPRO) 10 mg, Oral, Daily    meloxicam (MOBIC) 15 mg, Oral, Daily    NP THYROID 60 mg, Oral, Daily         ..  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   Tobacco  Use    Smoking status: Never     Passive exposure: Never    Smokeless tobacco: Never   Substance and Sexual Activity    Alcohol use: Yes     Alcohol/week: 1.0 standard drink of alcohol     Types: 1 Drinks containing 0.5 oz of alcohol per week     Comment: 1-2 times a month    Drug use: Never    Sexual activity: Yes     Partners: Male     Birth control/protection: None   Social History Narrative    ** Merged History Encounter **          Social Determinants of Health     Financial Resource Strain: Low Risk  (6/26/2024)    Overall Financial Resource Strain (CARDIA)     Difficulty of Paying Living Expenses: Not very hard   Food Insecurity: No Food Insecurity (6/26/2024)    Hunger Vital Sign     Worried About Running Out of Food in the Last Year: Never true     Ran Out of Food in the Last Year: Never true   Transportation Needs: No Transportation Needs (6/27/2024)    TRANSPORTATION NEEDS     Transportation : No   Physical Activity: Insufficiently Active (6/26/2024)    Exercise Vital Sign     Days of Exercise per Week: 3 days     Minutes of Exercise per Session: 40 min   Stress: No Stress Concern Present (6/26/2024)    [employer] [place] [employer] - Occupational Stress Questionnaire     Feeling of Stress : Not at all   Housing Stability: Unknown (6/26/2024)    Housing Stability Vital Sign     Unable to Pay for Housing in the Last Year: No          ..  Family History   Problem Relation Name Age of Onset    Diabetes type II Mother Aparna thomson     Arthritis Mother Aparna thomson     Diabetes Mother Aparna thomson     Hypertension Father Sugey Aguillon Sr.     COPD Maternal Grandmother Maria Luisa Bazzigus     Drug abuse Brother Sugey Aguillon     Early death Brother Sugey Aguillon           ..  Review of patient's allergies indicates:   Allergen Reactions    Codeine     Penicillin     Tramadol           ..  Immunization History   Administered Date(s) Administered    Influenza - Trivalent - PF (ADULT) 11/04/2019     "Tdap 06/13/2013          REVIEW OF SYSTEMS:    GENERAL:   no unexplained wt gain/loss,  fever, fatigue, chills, night sweats or weakness  HEENT: no sore throat, ear pain, sinus pressure, nasal congestion, or rhinorrhea  VISION: no vision changes, glaucoma, cataracts, + glasses and contacts, checked 2 months ago  CARDIAC: no chest pain, palpitations, dyspnea on exertion, orthopnea  RESPIRATORY: no cough, wheezing, sputum production, or SOB  GI: no abdominal pain, n&v, constipation, diarrhea,  blood in stool or  (--)family history of colon cancer    : no dysuria, hematuria, frequency,  urgency, + mixed incontinence,  vaginal discharge,  abnormal vaginal bleeding  MUSC/SKEL:  no myalgia, weakness, edema,  arthralgia, or joint effusion, disc disease cervical spine  SKIN:  No rash, hives, itching or sores, currently with poison ivy  NEURO:  No headaches, numbness,  tingling, weakness, or dizziness  PSYCH:  No anxiety,  depression,  irritability,  suicidal ideation or hallucinations  ENDO:  No polyuria, polydipsia,  polyphagia  HEME:  No bruising,  lymphadenopathy, bleeding disorders, no anemia       PHYSICAL EXAM:    Visit Vitals  /75   Pulse (!) 55   Temp 97.7 °F (36.5 °C)   Ht 5' 8" (1.727 m)   Wt 65.2 kg (143 lb 11.2 oz)   SpO2 99%   BMI 21.85 kg/m²       GENERAL:  Well-developed well-nourished female in NAD, alert and oriented x 3  SKIN:  no rash or abnormal appearing skin lesions  HEENT:  PERRLA, EOMI, mouth wnl, throat wnl, EAC and TM wnl bilaterally  NECK:  FROM, no lymphadenopathy, no thyroid abnormalities palpable  CHEST:  CTA bilaterally no wheezes, crackles or rubs  CARDIAC:  RRR, no murmurs audible  ABDOMEN:  Soft, nontender, nondistended, NBSx4, no rebound or guarding, no HSM  EXTREMITIES:  no clubbing, cyanosis, or edema.  joints wnl. +2 DP/PT pulse bilaterally  NEURO:  no sensory or motor deficits noted. CN II-XII intact. Gait wnl.           ASSESSMENT AND PLAN     1. Encounter for wellness " examination in adult  Overview:  Patient presents for wellness examination.    She is been feeling well.  Colon cancer screening discussed with patient; she is agreeable to a colonoscopy.  Gyn:  Dr. Riggins; she is not seen him in a few years.  Last mammogram May 2023.  Patient encouraged to continue to exercise regularly.  Labs pending.    06/27/2024:  Patient presents for wellness examination.    She has been feeling well.    She reports hair loss over the past year.    Patient encouraged to continue to exercise regularly.    Gyn: Dr. Riggins; patient has not had a checkup in years.  Patient encouraged to schedule follow-up.    Last mammogram 05/05/2023; order sent for mammogram.  Colon cancer screening options discussed with patient; patient elects to do a Cologuard.    Patient with bulging disc cervical spine; patient advised to schedule follow-up with Dr. Johnson.  Patient has a left upper extremity radiculopathy with left thumb numbness/weakness.  Lab orders in chart.      2. Hypothyroidism, unspecified type  Overview:  Pt has been on NP Thyroid 60 mg for 2 years; TSH and free T4 pending.    06/27/2024:  Patient has been compliant with medication,  NP thyroid 60 mg; refills sent.    TSH and free T4 levels pending.    Orders:  -     NP THYROID 60 mg Tab; Take 1 tablet (60 mg total) by mouth once daily.  Dispense: 90 tablet; Refill: 3    3. Generalized anxiety disorder  Overview:  Well controlled; continue Lexapro 10 mg; refills sent.    06/27/2024:  Her anxiety is well controlled; continue Lexapro.  Refills sent.    Patient denies panic attacks, sadness, depression, crying spells or suicidal thoughts.  ER precautions given.      4. Bulging of cervical intervertebral disc  Overview:  Patient with bulging of cervical intervertebral disc.    Patient had MRI in February.    Patient advised to schedule follow-up with Dr. Johnson.  Patient has numbness and tingling of left upper extremity including numbness and  significant weakness of left thumb.    Orders:  -     meloxicam (MOBIC) 15 MG tablet; Take 1 tablet (15 mg total) by mouth once daily.  Dispense: 90 tablet; Refill: 3    5. Colon cancer screening  Overview:  Colon cancer screening options discussed with patient; benefits and risks reviewed.  Patient is agreeable to screening colonoscopy.  Will refer.    Patient denies any abdominal pain, diarrhea, constipation, melena, hematochezia or unintentional weight loss.  Patient denies any family history of colon cancer.    06/27/2024:  Colon cancer screening options discussed with patient; benefits and risks reviewed.    Patient elects to do a Cologuard; patient advised should Cologuard return positive that she will need to do a follow-up colonoscopy.  Patient expressed understanding.    Patient denies abdominal pain, diarrhea, constipation, melena, hematochezia or unintentional weight loss.    Patient denies any family history of colon cancer.    Orders:  -     Cologuard Screening (Multitarget Stool DNA); Future; Expected date: 06/27/2024    6. Screening mammogram for breast cancer  Overview:  06/27/2024:  Order for screening mammogram sent to [facility].    Orders:  -     Mammo Digital Screening Bilat; Future; Expected date: 06/27/2024    Other orders  -     EScitalopram oxalate (LEXAPRO) 10 MG tablet; Take 1 tablet (10 mg total) by mouth once daily.  Dispense: 90 tablet; Refill: 3         ..No follow-ups on file.     No future appointments.

## 2024-06-27 ENCOUNTER — OFFICE VISIT (OUTPATIENT)
Dept: PRIMARY CARE CLINIC | Facility: CLINIC | Age: 49
End: 2024-06-27
Payer: COMMERCIAL

## 2024-06-27 ENCOUNTER — LAB VISIT (OUTPATIENT)
Dept: LAB | Facility: HOSPITAL | Age: 49
End: 2024-06-27
Attending: FAMILY MEDICINE
Payer: COMMERCIAL

## 2024-06-27 VITALS
TEMPERATURE: 98 F | BODY MASS INDEX: 21.78 KG/M2 | OXYGEN SATURATION: 99 % | DIASTOLIC BLOOD PRESSURE: 75 MMHG | HEIGHT: 68 IN | SYSTOLIC BLOOD PRESSURE: 114 MMHG | HEART RATE: 55 BPM | WEIGHT: 143.69 LBS

## 2024-06-27 DIAGNOSIS — F41.1 GENERALIZED ANXIETY DISORDER: ICD-10-CM

## 2024-06-27 DIAGNOSIS — Z12.31 SCREENING MAMMOGRAM FOR BREAST CANCER: ICD-10-CM

## 2024-06-27 DIAGNOSIS — Z12.11 COLON CANCER SCREENING: ICD-10-CM

## 2024-06-27 DIAGNOSIS — M50.30 BULGING OF CERVICAL INTERVERTEBRAL DISC: ICD-10-CM

## 2024-06-27 DIAGNOSIS — E03.9 HYPOTHYROIDISM, UNSPECIFIED TYPE: ICD-10-CM

## 2024-06-27 DIAGNOSIS — Z00.00 ENCOUNTER FOR WELLNESS EXAMINATION IN ADULT: Primary | ICD-10-CM

## 2024-06-27 DIAGNOSIS — Z00.00 WELLNESS EXAMINATION: ICD-10-CM

## 2024-06-27 LAB
ALBUMIN SERPL-MCNC: 4.5 G/DL (ref 3.5–5)
ALBUMIN/GLOB SERPL: 1.6 RATIO (ref 1.1–2)
ALP SERPL-CCNC: 78 UNIT/L (ref 40–150)
ALT SERPL-CCNC: 22 UNIT/L (ref 0–55)
ANION GAP SERPL CALC-SCNC: 8 MEQ/L
AST SERPL-CCNC: 20 UNIT/L (ref 5–34)
BASOPHILS # BLD AUTO: 0.04 X10(3)/MCL
BASOPHILS NFR BLD AUTO: 0.7 %
BILIRUB SERPL-MCNC: 0.5 MG/DL
BUN SERPL-MCNC: 26.4 MG/DL (ref 9.8–20.1)
CALCIUM SERPL-MCNC: 10.2 MG/DL (ref 8.4–10.2)
CHLORIDE SERPL-SCNC: 106 MMOL/L (ref 98–107)
CHOLEST SERPL-MCNC: 169 MG/DL
CHOLEST/HDLC SERPL: 3 {RATIO} (ref 0–5)
CO2 SERPL-SCNC: 28 MMOL/L (ref 22–29)
CREAT SERPL-MCNC: 0.77 MG/DL (ref 0.55–1.02)
CREAT/UREA NIT SERPL: 34
EOSINOPHIL # BLD AUTO: 0.18 X10(3)/MCL (ref 0–0.9)
EOSINOPHIL NFR BLD AUTO: 3.1 %
ERYTHROCYTE [DISTWIDTH] IN BLOOD BY AUTOMATED COUNT: 12 % (ref 11.5–17)
EST. AVERAGE GLUCOSE BLD GHB EST-MCNC: 91.1 MG/DL
GFR SERPLBLD CREATININE-BSD FMLA CKD-EPI: >60 ML/MIN/1.73/M2
GLOBULIN SER-MCNC: 2.9 GM/DL (ref 2.4–3.5)
GLUCOSE SERPL-MCNC: 100 MG/DL (ref 74–100)
HBA1C MFR BLD: 4.8 %
HCT VFR BLD AUTO: 43.1 % (ref 37–47)
HDLC SERPL-MCNC: 55 MG/DL (ref 35–60)
HGB BLD-MCNC: 14 G/DL (ref 12–16)
IMM GRANULOCYTES # BLD AUTO: 0.02 X10(3)/MCL (ref 0–0.04)
IMM GRANULOCYTES NFR BLD AUTO: 0.3 %
LDLC SERPL CALC-MCNC: 97 MG/DL (ref 50–140)
LYMPHOCYTES # BLD AUTO: 1.82 X10(3)/MCL (ref 0.6–4.6)
LYMPHOCYTES NFR BLD AUTO: 31.7 %
MCH RBC QN AUTO: 30.4 PG (ref 27–31)
MCHC RBC AUTO-ENTMCNC: 32.5 G/DL (ref 33–36)
MCV RBC AUTO: 93.5 FL (ref 80–94)
MONOCYTES # BLD AUTO: 0.43 X10(3)/MCL (ref 0.1–1.3)
MONOCYTES NFR BLD AUTO: 7.5 %
NEUTROPHILS # BLD AUTO: 3.25 X10(3)/MCL (ref 2.1–9.2)
NEUTROPHILS NFR BLD AUTO: 56.7 %
NRBC BLD AUTO-RTO: 0 %
PLATELET # BLD AUTO: 284 X10(3)/MCL (ref 130–400)
PMV BLD AUTO: 8.6 FL (ref 7.4–10.4)
POTASSIUM SERPL-SCNC: 5.3 MMOL/L (ref 3.5–5.1)
PROT SERPL-MCNC: 7.4 GM/DL (ref 6.4–8.3)
RBC # BLD AUTO: 4.61 X10(6)/MCL (ref 4.2–5.4)
SODIUM SERPL-SCNC: 142 MMOL/L (ref 136–145)
T4 FREE SERPL-MCNC: 0.79 NG/DL (ref 0.7–1.48)
TRIGL SERPL-MCNC: 84 MG/DL (ref 37–140)
TSH SERPL-ACNC: 0.88 UIU/ML (ref 0.35–4.94)
VLDLC SERPL CALC-MCNC: 17 MG/DL
WBC # BLD AUTO: 5.74 X10(3)/MCL (ref 4.5–11.5)

## 2024-06-27 PROCEDURE — 85025 COMPLETE CBC W/AUTO DIFF WBC: CPT

## 2024-06-27 PROCEDURE — 36415 COLL VENOUS BLD VENIPUNCTURE: CPT

## 2024-06-27 PROCEDURE — 80053 COMPREHEN METABOLIC PANEL: CPT

## 2024-06-27 PROCEDURE — 80061 LIPID PANEL: CPT

## 2024-06-27 PROCEDURE — 84443 ASSAY THYROID STIM HORMONE: CPT

## 2024-06-27 PROCEDURE — 83036 HEMOGLOBIN GLYCOSYLATED A1C: CPT

## 2024-06-27 PROCEDURE — 84439 ASSAY OF FREE THYROXINE: CPT

## 2024-06-27 RX ORDER — MELOXICAM 15 MG/1
15 TABLET ORAL DAILY
Qty: 90 TABLET | Refills: 3 | Status: SHIPPED | OUTPATIENT
Start: 2024-06-27

## 2024-06-27 RX ORDER — ESCITALOPRAM OXALATE 10 MG/1
10 TABLET ORAL DAILY
Qty: 90 TABLET | Refills: 3 | Status: CANCELLED | OUTPATIENT
Start: 2024-06-27 | End: 2025-06-22

## 2024-06-27 RX ORDER — LEVOTHYROXINE, LIOTHYRONINE 38; 9 UG/1; UG/1
60 TABLET ORAL DAILY
Qty: 90 TABLET | Refills: 3 | Status: SHIPPED | OUTPATIENT
Start: 2024-06-27

## 2024-06-27 RX ORDER — ESCITALOPRAM OXALATE 10 MG/1
10 TABLET ORAL DAILY
Qty: 90 TABLET | Refills: 3 | Status: SHIPPED | OUTPATIENT
Start: 2024-06-27 | End: 2025-06-22

## 2024-07-08 ENCOUNTER — TELEPHONE (OUTPATIENT)
Dept: PRIMARY CARE CLINIC | Facility: CLINIC | Age: 49
End: 2024-07-08
Payer: COMMERCIAL

## 2024-07-08 DIAGNOSIS — E03.9 HYPOTHYROIDISM, UNSPECIFIED TYPE: ICD-10-CM

## 2024-07-08 RX ORDER — LEVOTHYROXINE, LIOTHYRONINE 38; 9 UG/1; UG/1
60 TABLET ORAL DAILY
Qty: 90 TABLET | Refills: 3 | Status: SHIPPED | OUTPATIENT
Start: 2024-07-08 | End: 2024-07-10 | Stop reason: SDUPTHER

## 2024-07-08 NOTE — TELEPHONE ENCOUNTER
----- Message from Roselyn Spain sent at 7/5/2024  9:58 AM CDT -----  .Who Called: Roberth Castillo    Caller is requesting assistance/information from provider's office.    Symptoms (please be specific): n/a   How long has patient had these symptoms:  n/a  List of preferred pharmacies on file (remove unneeded): [unfilled]  If different, enter pharmacy into here including location and phone number: n/a      Preferred Method of Contact: Phone Call  Patient's Preferred Phone Number on File: [phone]   Best Call Back Number, if different:  Additional Information: Pt stated she is completely out of her thyroid medication and is wanting to switch over as instructed. Please call to advise.

## 2024-07-10 ENCOUNTER — TELEPHONE (OUTPATIENT)
Dept: PRIMARY CARE CLINIC | Facility: CLINIC | Age: 49
End: 2024-07-10
Payer: COMMERCIAL

## 2024-07-10 DIAGNOSIS — E03.9 HYPOTHYROIDISM, UNSPECIFIED TYPE: Primary | ICD-10-CM

## 2024-07-10 DIAGNOSIS — E03.9 HYPOTHYROIDISM, UNSPECIFIED TYPE: ICD-10-CM

## 2024-07-10 DIAGNOSIS — M50.30 BULGING OF CERVICAL INTERVERTEBRAL DISC: ICD-10-CM

## 2024-07-10 RX ORDER — LEVOTHYROXINE, LIOTHYRONINE 38; 9 UG/1; UG/1
60 TABLET ORAL DAILY
Qty: 90 TABLET | Refills: 3 | Status: SHIPPED | OUTPATIENT
Start: 2024-07-10 | End: 2024-07-10 | Stop reason: CLARIF

## 2024-07-10 RX ORDER — MELOXICAM 15 MG/1
15 TABLET ORAL DAILY
Qty: 90 TABLET | Refills: 3 | Status: SHIPPED | OUTPATIENT
Start: 2024-07-10 | End: 2024-07-10 | Stop reason: CLARIF

## 2024-07-10 RX ORDER — MELOXICAM 15 MG/1
15 TABLET ORAL DAILY
Qty: 90 TABLET | Refills: 3 | Status: SHIPPED | OUTPATIENT
Start: 2024-07-10

## 2024-07-10 RX ORDER — THYROID 90 MG/1
90 TABLET ORAL
Qty: 90 TABLET | Refills: 1 | Status: SHIPPED | OUTPATIENT
Start: 2024-07-10 | End: 2025-01-06

## 2024-07-10 NOTE — TELEPHONE ENCOUNTER
Spoke with pt. Instructed new medication orders sent in per Dr Rojo. Instructed pt to come by office to  samples x 14 days. Patient verbalized understanding. Repeat labs orders printed and to be done in 3 months.

## 2024-07-10 NOTE — TELEPHONE ENCOUNTER
Spoke with pt. She states you told her you were changing her Thyroid medication, but the NP Thyroid 60 mg is what she was taking previously. Pt states she's concerned about increased hair loss. Nowhere is a medication change noted in your notes. She has told her mail order pharmacy not to send her refill because it is not changed to correct medication. Please advise.   I told patient that I have samples here in office to  of  NP Thyroid 60 mg, but she is requesting you verify the medication change and send new 90 day script to mail in pharmacy

## 2024-07-10 NOTE — TELEPHONE ENCOUNTER
----- Message from Sharon Mark sent at 7/9/2024  9:17 AM CDT -----  Who Called: Roberth Castillo    Caller is requesting assistance/information from provider's office.  Preferred Method of Contact: Phone Call  Patient's Preferred Phone Number on File: [phone]   Best Call Back Number, if different:  Additional Information: medical advice,  pt called highly upset stated that  after 4 attempts/messages sent, medication sent to mail-order pharmacy is incorrect, pt stated she was requesting a different medication Rx per ( orders),for thyroid,  pt also stated due to sending to her mail- order pharmacy she will not receive in time so she is requesting Rx to be sent to [facility] - [place], [place] [address] [address] [place]  Phone: [phone]  Fax: [phone],   please advise, thanks

## 2024-07-15 ENCOUNTER — PATIENT OUTREACH (OUTPATIENT)
Facility: CLINIC | Age: 49
End: 2024-07-15
Payer: COMMERCIAL

## 2024-07-15 NOTE — PROGRESS NOTES
Health Maintenance Topic(s) Outreach Outcomes & Actions Taken:  Chart review, last PCP visit 6/27/24 no future follow ups.        Colorectal Cancer Screening - Outreach Outcomes & Actions Taken  : Cologuard ordered.    Breast Cancer Screening - Outreach Outcomes & Actions Taken  : mammogram ordered.     Additional Notes:           Care Management, Digital Medicine, and/or Education Referrals

## 2024-09-30 PROBLEM — Z00.00 ENCOUNTER FOR WELLNESS EXAMINATION IN ADULT: Status: RESOLVED | Noted: 2022-05-30 | Resolved: 2024-09-30

## 2025-01-07 ENCOUNTER — TELEPHONE (OUTPATIENT)
Dept: PRIMARY CARE CLINIC | Facility: CLINIC | Age: 50
End: 2025-01-07
Payer: COMMERCIAL

## 2025-01-07 DIAGNOSIS — E03.9 HYPOTHYROIDISM, UNSPECIFIED TYPE: Primary | ICD-10-CM

## 2025-01-07 RX ORDER — THYROID 90 MG/1
90 TABLET ORAL
Qty: 90 TABLET | Refills: 2 | Status: SHIPPED | OUTPATIENT
Start: 2025-01-07 | End: 2025-07-06

## 2025-01-07 NOTE — TELEPHONE ENCOUNTER
----- Message from Isela sent at 1/7/2025  4:04 PM CST -----  .Type:  RX Refill Request    Who Called: PT  Refill or New Rx:REFILL   RX Name and Strength:thyroid, pork, (NP THYROID) 90 mg Tab  How is the patient currently taking it? (ex. 1XDay):1/DAY  Is this a 30 day or 90 day RX:90  Preferred Pharmacy with phone number:[facility]  Local or Mail Order[facility]  Ordering Provider:CLOVER  Would the patient rather a call back or a response via [facility]?   Best Call Back Number:[phone]  Additional Information: thyroid, pork, (NP THYROID) 90 mg Tab